# Patient Record
Sex: MALE | Race: OTHER | HISPANIC OR LATINO | URBAN - METROPOLITAN AREA
[De-identification: names, ages, dates, MRNs, and addresses within clinical notes are randomized per-mention and may not be internally consistent; named-entity substitution may affect disease eponyms.]

---

## 2018-05-13 ENCOUNTER — EMERGENCY (EMERGENCY)
Facility: HOSPITAL | Age: 38
LOS: 0 days | Discharge: HOME | End: 2018-05-13
Attending: EMERGENCY MEDICINE | Admitting: EMERGENCY MEDICINE

## 2018-05-13 VITALS
DIASTOLIC BLOOD PRESSURE: 87 MMHG | SYSTOLIC BLOOD PRESSURE: 142 MMHG | HEART RATE: 100 BPM | RESPIRATION RATE: 20 BRPM | OXYGEN SATURATION: 98 % | TEMPERATURE: 102 F

## 2018-05-13 VITALS
DIASTOLIC BLOOD PRESSURE: 87 MMHG | TEMPERATURE: 98 F | SYSTOLIC BLOOD PRESSURE: 131 MMHG | OXYGEN SATURATION: 98 % | HEART RATE: 86 BPM | RESPIRATION RATE: 18 BRPM

## 2018-05-13 DIAGNOSIS — R30.0 DYSURIA: ICD-10-CM

## 2018-05-13 DIAGNOSIS — J18.9 PNEUMONIA, UNSPECIFIED ORGANISM: ICD-10-CM

## 2018-05-13 DIAGNOSIS — M79.1 MYALGIA: ICD-10-CM

## 2018-05-13 DIAGNOSIS — J02.9 ACUTE PHARYNGITIS, UNSPECIFIED: ICD-10-CM

## 2018-05-13 DIAGNOSIS — R35.0 FREQUENCY OF MICTURITION: ICD-10-CM

## 2018-05-13 DIAGNOSIS — R10.84 GENERALIZED ABDOMINAL PAIN: ICD-10-CM

## 2018-05-13 DIAGNOSIS — R50.9 FEVER, UNSPECIFIED: ICD-10-CM

## 2018-05-13 LAB
ALBUMIN SERPL ELPH-MCNC: 4.5 G/DL — SIGNIFICANT CHANGE UP (ref 3.5–5.2)
ALP SERPL-CCNC: 110 U/L — SIGNIFICANT CHANGE UP (ref 30–115)
ALT FLD-CCNC: 32 U/L — SIGNIFICANT CHANGE UP (ref 0–41)
ANION GAP SERPL CALC-SCNC: 17 MMOL/L — HIGH (ref 7–14)
APPEARANCE UR: (no result)
AST SERPL-CCNC: 27 U/L — SIGNIFICANT CHANGE UP (ref 0–41)
BASOPHILS # BLD AUTO: 0.04 K/UL — SIGNIFICANT CHANGE UP (ref 0–0.2)
BASOPHILS NFR BLD AUTO: 0.4 % — SIGNIFICANT CHANGE UP (ref 0–1)
BILIRUB SERPL-MCNC: 0.3 MG/DL — SIGNIFICANT CHANGE UP (ref 0.2–1.2)
BILIRUB UR-MCNC: NEGATIVE — SIGNIFICANT CHANGE UP
BUN SERPL-MCNC: 8 MG/DL — LOW (ref 10–20)
CALCIUM SERPL-MCNC: 9.3 MG/DL — SIGNIFICANT CHANGE UP (ref 8.5–10.1)
CHLORIDE SERPL-SCNC: 98 MMOL/L — SIGNIFICANT CHANGE UP (ref 98–110)
CO2 SERPL-SCNC: 22 MMOL/L — SIGNIFICANT CHANGE UP (ref 17–32)
COLOR SPEC: YELLOW — SIGNIFICANT CHANGE UP
CREAT SERPL-MCNC: 0.7 MG/DL — SIGNIFICANT CHANGE UP (ref 0.7–1.5)
DIFF PNL FLD: (no result)
EOSINOPHIL # BLD AUTO: 0.02 K/UL — SIGNIFICANT CHANGE UP (ref 0–0.7)
EOSINOPHIL NFR BLD AUTO: 0.2 % — SIGNIFICANT CHANGE UP (ref 0–8)
GLUCOSE SERPL-MCNC: 100 MG/DL — HIGH (ref 70–99)
GLUCOSE UR QL: NEGATIVE MG/DL — SIGNIFICANT CHANGE UP
HCT VFR BLD CALC: 44.7 % — SIGNIFICANT CHANGE UP (ref 42–52)
HGB BLD-MCNC: 15.4 G/DL — SIGNIFICANT CHANGE UP (ref 14–18)
IMM GRANULOCYTES NFR BLD AUTO: 0.4 % — HIGH (ref 0.1–0.3)
KETONES UR-MCNC: NEGATIVE — SIGNIFICANT CHANGE UP
LEUKOCYTE ESTERASE UR-ACNC: NEGATIVE — SIGNIFICANT CHANGE UP
LIDOCAIN IGE QN: 26 U/L — SIGNIFICANT CHANGE UP (ref 7–60)
LYMPHOCYTES # BLD AUTO: 1.96 K/UL — SIGNIFICANT CHANGE UP (ref 1.2–3.4)
LYMPHOCYTES # BLD AUTO: 20.2 % — LOW (ref 20.5–51.1)
MCHC RBC-ENTMCNC: 30.5 PG — SIGNIFICANT CHANGE UP (ref 27–31)
MCHC RBC-ENTMCNC: 34.5 G/DL — SIGNIFICANT CHANGE UP (ref 32–37)
MCV RBC AUTO: 88.5 FL — SIGNIFICANT CHANGE UP (ref 80–94)
MONOCYTES # BLD AUTO: 0.77 K/UL — HIGH (ref 0.1–0.6)
MONOCYTES NFR BLD AUTO: 7.9 % — SIGNIFICANT CHANGE UP (ref 1.7–9.3)
NEUTROPHILS # BLD AUTO: 6.86 K/UL — HIGH (ref 1.4–6.5)
NEUTROPHILS NFR BLD AUTO: 70.9 % — SIGNIFICANT CHANGE UP (ref 42.2–75.2)
NITRITE UR-MCNC: NEGATIVE — SIGNIFICANT CHANGE UP
NRBC # BLD: 0 /100 WBCS — SIGNIFICANT CHANGE UP (ref 0–0)
PH UR: 6.5 — SIGNIFICANT CHANGE UP (ref 5–8)
PLATELET # BLD AUTO: 192 K/UL — SIGNIFICANT CHANGE UP (ref 130–400)
POTASSIUM SERPL-MCNC: 4.7 MMOL/L — SIGNIFICANT CHANGE UP (ref 3.5–5)
POTASSIUM SERPL-SCNC: 4.7 MMOL/L — SIGNIFICANT CHANGE UP (ref 3.5–5)
PROT SERPL-MCNC: 7.1 G/DL — SIGNIFICANT CHANGE UP (ref 6–8)
PROT UR-MCNC: NEGATIVE MG/DL — SIGNIFICANT CHANGE UP
RBC # BLD: 5.05 M/UL — SIGNIFICANT CHANGE UP (ref 4.7–6.1)
RBC # FLD: 12.6 % — SIGNIFICANT CHANGE UP (ref 11.5–14.5)
RBC CASTS # UR COMP ASSIST: SIGNIFICANT CHANGE UP /HPF
SODIUM SERPL-SCNC: 137 MMOL/L — SIGNIFICANT CHANGE UP (ref 135–146)
SP GR SPEC: 1.01 — SIGNIFICANT CHANGE UP (ref 1.01–1.03)
UROBILINOGEN FLD QL: 0.2 MG/DL — SIGNIFICANT CHANGE UP (ref 0.2–0.2)
WBC # BLD: 9.69 K/UL — SIGNIFICANT CHANGE UP (ref 4.8–10.8)
WBC # FLD AUTO: 9.69 K/UL — SIGNIFICANT CHANGE UP (ref 4.8–10.8)

## 2018-05-13 RX ORDER — SODIUM CHLORIDE 9 MG/ML
1000 INJECTION INTRAMUSCULAR; INTRAVENOUS; SUBCUTANEOUS ONCE
Qty: 0 | Refills: 0 | Status: COMPLETED | OUTPATIENT
Start: 2018-05-13 | End: 2018-05-13

## 2018-05-13 RX ORDER — KETOROLAC TROMETHAMINE 30 MG/ML
30 SYRINGE (ML) INJECTION ONCE
Qty: 0 | Refills: 0 | Status: DISCONTINUED | OUTPATIENT
Start: 2018-05-13 | End: 2018-05-13

## 2018-05-13 RX ORDER — CLARITHROMYCIN 500 MG
1 TABLET ORAL
Qty: 1 | Refills: 0 | OUTPATIENT
Start: 2018-05-13 | End: 2018-05-17

## 2018-05-13 RX ORDER — ACETAMINOPHEN 500 MG
975 TABLET ORAL ONCE
Qty: 0 | Refills: 0 | Status: COMPLETED | OUTPATIENT
Start: 2018-05-13 | End: 2018-05-13

## 2018-05-13 RX ADMIN — Medication 30 MILLIGRAM(S): at 12:06

## 2018-05-13 RX ADMIN — SODIUM CHLORIDE 1000 MILLILITER(S): 9 INJECTION INTRAMUSCULAR; INTRAVENOUS; SUBCUTANEOUS at 12:06

## 2018-05-13 RX ADMIN — Medication 975 MILLIGRAM(S): at 10:04

## 2018-05-13 NOTE — ED PROVIDER NOTE - PHYSICAL EXAMINATION
VITAL SIGNS: AFebrile, vital signs stable  CONSTITUTIONAL: Well-developed; well-nourished; in no acute distress.  SKIN: Skin exam is warm and dry, no acute rash.  HEAD: Normocephalic; atraumatic.  EYES: Pupils equal round reactive to light, Extraocular movements intact; conjunctiva and sclera clear.  ENT: No nasal discharge; airway clear. Moist mucus membranes. OP erythema and exudates bilat, no edema/PTA. midline uvula. normal speech no drooling.  NECK: Supple; non tender. No rigidity  CARD: Regular rate and rhythm. Normal S1, S2; no murmurs, gallops, or rubs.  RESP: Lungs clear to auscultation bilaterally. No wheezes, rales or rhonchi.  ABD: Abdomen soft; non-tender; non-distended;  no hepatosplenomegaly. No costovertebral angle tenderness.   EXT: Normal ROM. No clubbing, cyanosis or edema. No calf tenderness to palpation.  NEURO: Alert and Oriented x 3, Cranial nerves 2-12 intact, No nystagmus.  5/5 motor x 4 extremities, Sensation intact to light touch x 4 extremities, No facial droop or slurred speech. No pronator drift.  Normal rapid alternating movement and finger nose finger bilaterally. No midline cervical/thoracic/lumbar tenderness to palpation or step off. Normal gait, No ataxia.  PSYCH: Cooperative, appropriate.

## 2018-05-13 NOTE — ED PROVIDER NOTE - MEDICAL DECISION MAKING DETAILS
pt w ?PNA on CXR, definitely had pharyngitis w exudates on exam will treat both w azithromycin x 5 days, well laurie tolerating po, will dc home w medicine clinic f/u 1-2 weeks as outpt. strict return precautions provided. pt w ?PNA on CXR, definitely had pharyngitis w exudates on exam will treat both w azithromycin x 5 days, well appearing, tolerating po, ambulating independently in ED, feels better, will dc home w medicine clinic f/u 1-2 weeks as outpt. strict return precautions provided. pt w ?PNA on CXR (no cough), definitely had pharyngitis w exudates on exam will treat both w azithromycin x 5 days, well appearing, tolerating po, ambulating independently in ED, feels better, will dc home w medicine clinic f/u 1-2 weeks as outpt. strict return precautions provided.

## 2018-05-13 NOTE — ED PROVIDER NOTE - PLAN OF CARE
a/p: Fever, aches, pharyngitis on exam, urinary sx, will do labs, CXR, UA, ivf, toradol, treat pharyngitis w abx and re-eval. abd pain resolved and nontender on exam, tolerating po. H&P not c/w meningitis, appendicitis.

## 2018-05-13 NOTE — ED PROVIDER NOTE - CARE PLAN
Principal Discharge DX:	Pharyngitis  Secondary Diagnosis:	Pneumonia Principal Discharge DX:	Pharyngitis  Assessment and plan of treatment:	a/p: Fever, aches, pharyngitis on exam, urinary sx, will do labs, CXR, UA, ivf, toradol, treat pharyngitis w abx and re-eval. abd pain resolved and nontender on exam, tolerating po. H&P not c/w meningitis, appendicitis.  Secondary Diagnosis:	Pneumonia

## 2018-05-13 NOTE — ED ADULT NURSE REASSESSMENT NOTE - NS ED NURSE REASSESS COMMENT FT1
Pt alert and orientedx3, pt has no active dirrahea/vomiting at this time. pt complains of decreased appetite and headache. toradol given, will reassess. Safety maintained and hourly rounding performed. Will continue with plan of care.

## 2018-05-13 NOTE — ED PROVIDER NOTE - OBJECTIVE STATEMENT
38M no pmh, p/w 3 days fever, chills, body aches, sore throat, intermittent crampy diffuse abd pain which is not present at this time. nausea and diarrhea watery assoc. no vomiting. no cp, sob. no ha, numbness, weakness, neck pain, AMS. no rash. pt c/o dysuria and freq as well. no hematuria, flank pain. no recent travel, sick contacts. no cough, runny nose, ear ache.

## 2018-05-13 NOTE — ED PROVIDER NOTE - NS ED ROS FT
Review of Systems:  	•	CONSTITUTIONAL - + fever, No diaphoresis, No weight change  	•	SKIN - No rash  	•	HEMATOLOGIC - No abnormal bleeding or bruising  	•	EYES - No eye pain, No blurred vision  	•	ENT - No change in hearing, +sore throat, No neck pain, No rhinorrhea, No ear pain  	•	RESPIRATORY - No shortness of breath, No cough  	•	CARDIAC - No chest pain, No palpitations  	•	GI - + abdominal pain, + nausea, No vomiting, + diarrhea, No constipation, No bright red blood per rectum or melena.                      •                 - + dysuria, +frequency, no hematuria.   	•	ENDO - No polydypsia, No polyuria, No heat/cold intolerance  	•	MUSCULOSKELETAL - No joint paint, No swelling, No back pain  	•	NEUROLOGIC - No numbness, No focal weakness, No headache, No dizziness

## 2018-05-14 LAB
CULTURE RESULTS: NO GROWTH — SIGNIFICANT CHANGE UP
SPECIMEN SOURCE: SIGNIFICANT CHANGE UP

## 2025-06-13 ENCOUNTER — EMERGENCY (EMERGENCY)
Facility: HOSPITAL | Age: 45
LOS: 0 days | Discharge: HOME | End: 2025-06-13
Attending: STUDENT IN AN ORGANIZED HEALTH CARE EDUCATION/TRAINING PROGRAM
Payer: COMMERCIAL

## 2025-06-13 VITALS
HEIGHT: 64 IN | OXYGEN SATURATION: 98 % | SYSTOLIC BLOOD PRESSURE: 134 MMHG | TEMPERATURE: 98 F | HEART RATE: 62 BPM | RESPIRATION RATE: 18 BRPM | DIASTOLIC BLOOD PRESSURE: 68 MMHG | WEIGHT: 185.41 LBS

## 2025-06-13 DIAGNOSIS — S06.0X9A CONCUSSION WITH LOSS OF CONSCIOUSNESS OF UNSPECIFIED DURATION, INITIAL ENCOUNTER: ICD-10-CM

## 2025-06-13 DIAGNOSIS — R29.810 FACIAL WEAKNESS: ICD-10-CM

## 2025-06-13 DIAGNOSIS — H53.8 OTHER VISUAL DISTURBANCES: ICD-10-CM
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
## 2025-06-13 DIAGNOSIS — R51.9 HEADACHE, UNSPECIFIED: ICD-10-CM

## 2025-06-13 DIAGNOSIS — S19.9XXA UNSPECIFIED INJURY OF NECK, INITIAL ENCOUNTER: ICD-10-CM
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
## 2025-06-13 LAB
ANION GAP SERPL CALC-SCNC: 10 MMOL/L — SIGNIFICANT CHANGE UP (ref 7–14)
BASOPHILS # BLD AUTO: 0.03 K/UL — SIGNIFICANT CHANGE UP (ref 0–0.2)
BASOPHILS NFR BLD AUTO: 0.5 % — SIGNIFICANT CHANGE UP (ref 0–1)
BUN SERPL-MCNC: 24 MG/DL — HIGH (ref 10–20)
CALCIUM SERPL-MCNC: 8.9 MG/DL — SIGNIFICANT CHANGE UP (ref 8.4–10.5)
CHLORIDE SERPL-SCNC: 108 MMOL/L — SIGNIFICANT CHANGE UP (ref 98–110)
CO2 SERPL-SCNC: 23 MMOL/L — SIGNIFICANT CHANGE UP (ref 17–32)
CREAT SERPL-MCNC: 1 MG/DL — SIGNIFICANT CHANGE UP (ref 0.7–1.5)
EGFR: 95 ML/MIN/1.73M2 — SIGNIFICANT CHANGE UP
EGFR: 95 ML/MIN/1.73M2 — SIGNIFICANT CHANGE UP
EOSINOPHIL # BLD AUTO: 0.29 K/UL — SIGNIFICANT CHANGE UP (ref 0–0.7)
EOSINOPHIL NFR BLD AUTO: 4.8 % — SIGNIFICANT CHANGE UP (ref 0–8)
GLUCOSE SERPL-MCNC: 109 MG/DL — HIGH (ref 70–99)
HCT VFR BLD CALC: 39.7 % — LOW (ref 42–52)
HGB BLD-MCNC: 13.1 G/DL — LOW (ref 14–18)
IMM GRANULOCYTES NFR BLD AUTO: 0.3 % — SIGNIFICANT CHANGE UP (ref 0.1–0.3)
LYMPHOCYTES # BLD AUTO: 2.2 K/UL — SIGNIFICANT CHANGE UP (ref 1.2–3.4)
LYMPHOCYTES # BLD AUTO: 36.6 % — SIGNIFICANT CHANGE UP (ref 20.5–51.1)
MCHC RBC-ENTMCNC: 31.3 PG — HIGH (ref 27–31)
MCHC RBC-ENTMCNC: 33 G/DL — SIGNIFICANT CHANGE UP (ref 32–37)
MCV RBC AUTO: 94.7 FL — HIGH (ref 80–94)
MONOCYTES # BLD AUTO: 0.4 K/UL — SIGNIFICANT CHANGE UP (ref 0.1–0.6)
MONOCYTES NFR BLD AUTO: 6.7 % — SIGNIFICANT CHANGE UP (ref 1.7–9.3)
NEUTROPHILS # BLD AUTO: 3.07 K/UL — SIGNIFICANT CHANGE UP (ref 1.4–6.5)
NEUTROPHILS NFR BLD AUTO: 51.1 % — SIGNIFICANT CHANGE UP (ref 42.2–75.2)
NRBC BLD AUTO-RTO: 0 /100 WBCS — SIGNIFICANT CHANGE UP (ref 0–0)
PLATELET # BLD AUTO: 169 K/UL — SIGNIFICANT CHANGE UP (ref 130–400)
PMV BLD: 11.3 FL — HIGH (ref 7.4–10.4)
POTASSIUM SERPL-MCNC: 4 MMOL/L — SIGNIFICANT CHANGE UP (ref 3.5–5)
POTASSIUM SERPL-SCNC: 4 MMOL/L — SIGNIFICANT CHANGE UP (ref 3.5–5)
RBC # BLD: 4.19 M/UL — LOW (ref 4.7–6.1)
RBC # FLD: 12.9 % — SIGNIFICANT CHANGE UP (ref 11.5–14.5)
SODIUM SERPL-SCNC: 141 MMOL/L — SIGNIFICANT CHANGE UP (ref 135–146)
WBC # BLD: 6.01 K/UL — SIGNIFICANT CHANGE UP (ref 4.8–10.8)
WBC # FLD AUTO: 6.01 K/UL — SIGNIFICANT CHANGE UP (ref 4.8–10.8)

## 2025-06-13 PROCEDURE — 70450 CT HEAD/BRAIN W/O DYE: CPT | Mod: 26,59

## 2025-06-13 PROCEDURE — 99285 EMERGENCY DEPT VISIT HI MDM: CPT

## 2025-06-13 PROCEDURE — 70450 CT HEAD/BRAIN W/O DYE: CPT

## 2025-06-13 PROCEDURE — 36415 COLL VENOUS BLD VENIPUNCTURE: CPT

## 2025-06-13 PROCEDURE — 85025 COMPLETE CBC W/AUTO DIFF WBC: CPT

## 2025-06-13 PROCEDURE — 70498 CT ANGIOGRAPHY NECK: CPT | Mod: 26

## 2025-06-13 PROCEDURE — 70498 CT ANGIOGRAPHY NECK: CPT

## 2025-06-13 PROCEDURE — 99284 EMERGENCY DEPT VISIT MOD MDM: CPT | Mod: 25

## 2025-06-13 PROCEDURE — 99053 MED SERV 10PM-8AM 24 HR FAC: CPT

## 2025-06-13 PROCEDURE — 80048 BASIC METABOLIC PNL TOTAL CA: CPT

## 2025-06-13 PROCEDURE — 70496 CT ANGIOGRAPHY HEAD: CPT | Mod: 26

## 2025-06-13 PROCEDURE — 36000 PLACE NEEDLE IN VEIN: CPT | Mod: XU

## 2025-06-13 PROCEDURE — 70496 CT ANGIOGRAPHY HEAD: CPT

## 2025-06-13 RX ORDER — LIDOCAINE HYDROCHLORIDE 20 MG/ML
1 JELLY TOPICAL ONCE
Refills: 0 | Status: COMPLETED | OUTPATIENT
Start: 2025-06-13 | End: 2025-06-13

## 2025-06-13 RX ORDER — IBUPROFEN 200 MG
600 TABLET ORAL ONCE
Refills: 0 | Status: COMPLETED | OUTPATIENT
Start: 2025-06-13 | End: 2025-06-13

## 2025-06-13 RX ADMIN — Medication 600 MILLIGRAM(S): at 00:59

## 2025-06-13 RX ADMIN — LIDOCAINE HYDROCHLORIDE 1 PATCH: 20 JELLY TOPICAL at 00:59

## 2025-06-13 NOTE — ED PROVIDER NOTE - NSFOLLOWUPINSTRUCTIONS_ED_ALL_ED_FT
Concussion, Adult  Three rear views of the head showing how quick, sudden head movements injure the brain.  A concussion is a brain injury from a hard, direct hit (trauma) to the head or body. This direct hit causes the brain to shake quickly back and forth inside the skull. This can damage brain cells and cause chemical changes in the brain. A concussion may also be known as a mild traumatic brain injury (TBI).    The effects of a concussion can be serious. If you have a concussion, you should be very careful to avoid having a second concussion.    What are the causes?  This condition is caused by:  A direct hit to your head.  Sudden movement of your body that causes your brain to move back and forth inside the skull, such as in a car crash.  What are the signs or symptoms?  The signs of a concussion can be hard to notice. Early on, they may be missed by you, family members, and health care providers. You may look fine on the outside but may act or feel differently.    Every head injury is different. Symptoms are usually temporary but may last for days, weeks, or even months. Some symptoms appear right away, but other symptoms may not show up for hours or days.    Physical symptoms    Headaches.  Dizziness and problems with coordination or balance.  Sensitivity to light or noise.  Nausea or vomiting.  Tiredness (fatigue).  Vision or hearing problems.  Seizure.  Mental and emotional symptoms    Irritability or mood changes.  Memory problems.  Trouble concentrating, organizing, or making decisions.  Changes in eating or sleeping patterns.  Slowness in thinking, acting or reacting, speaking, or reading.  Anxiety or depression.  How is this diagnosed?  This condition is diagnosed based on your symptoms and injury.    You may also have tests, including:  Imaging tests, such as a CT scan or an MRI.  Neuropsychological tests. These measure your thinking, understanding, learning, and memory.  How is this treated?  Treatment for this condition includes:  Stopping sports or activity if you are injured.  Physical and mental rest and careful observation, usually at home.  Medicines to help with symptoms such as headaches, nausea, or difficulty sleeping.  Referral to a concussion clinic or rehab center.  Follow these instructions at home:  Activity    Limit activities that require a lot of thought or concentration, such as:  Doing homework or job-related work.  Watching TV.  Using the computer or phone.  Playing memory games and doing puzzles.  Rest helps your brain heal. Make sure you:  Get plenty of sleep. Most adults should get 7–9 hours of sleep each night.  Rest during the day. Take naps or rest breaks when you feel tired.  Avoid high-intensity exercise or physical activities that take a lot of effort. Stop any activity that worsens symptoms. Your health care provider may recommend light exercise such as walking.  Do not do high-risk activities that could cause a second concussion, such as riding a bike or playing sports.  Ask your health care provider when you can return to your normal activities, such as school, work, sports, and driving. Your ability to react may be slower after a brain injury. Never do these activities if you are dizzy.  General instructions    A bottle of beer, a glass of wine, and a glass of hard liquor with a "do not drink" sign over them.   Take over-the-counter and prescription medicines only as told by your health care provider. Some medicines, such as blood thinners (anticoagulants) and aspirin, may increase the risk for complications, such as bleeding.  Avoid taking opioid pain medicine while recovering from a concussion.  Do not drink alcohol until your health care provider says you can. Drinking alcohol may slow your recovery and can put you at risk of further injury.  Watch your symptoms and tell others around you to do the same. Complications sometimes occur after a concussion.  Tell your , teachers, school nurse, school counselor, , or  about your injury, symptoms, and restrictions.  See a mental health therapist if you feel anxious or depressed. Managing this condition can be challenging.  Keep all follow-up visits. Your health care provider will check on your recovery and give you a plan for returning to activities.  How is this prevented?  Avoiding another brain injury is very important. In rare cases, another injury can lead to permanent brain damage, brain swelling, or death. The risk of this is greatest during the first 7–10 days after a head injury. Avoid injuries by:  Stopping activities that could lead to a second concussion, such as contact or recreational sports, until your health care provider says it is okay.  Taking these actions once you have returned to sports or activities:  Avoid plays or moves that can cause you to crash into another person. This is how most concussions occur.  Follow the rules and be respectful of other players. Do not engage in violent or illegal plays.  Getting regular exercise that includes strength and balance training.  Wearing a properly fitting helmet during sports, biking, or other activities. Helmets can help protect you from serious skull and brain injuries, but they may not protect you from a concussion. Even when wearing a helmet, you should avoid being hit in the head.  Where to find more information  Centers for Disease Control and Prevention: cdc.gov  Contact a health care provider if:  Your symptoms do not improve or get worse.  You have new symptoms.  You have another injury.  Your coordination gets worse.  You have unusual behavior changes.  Get help right away if:  You have a severe or worsening headache.  You have weakness or numbness in any part of your body, slurred speech, vision changes, or confusion.  You vomit repeatedly.  You lose consciousness, are sleepier than normal, or are difficult to wake up.  You have a seizure.  These symptoms may be an emergency. Get help right away. Call 911.  Do not wait to see if the symptoms will go away.  Do not drive yourself to the hospital.  Also, get help right away if:  You have thoughts of hurting yourself or others.  Take one of these steps if you feel like you may hurt yourself or others, or have thoughts about taking your own life:  Go to your nearest emergency room.  Call 911.  Call the National Suicide Prevention Lifeline at 1-245.288.7754 or 732. This is open 24 hours a day.  Text the Crisis Text Line at 405879.  This information is not intended to replace advice given to you by your health care provider. Make sure you discuss any questions you have with your health care provider.          Our Emergency Department Referral Coordinators will be reaching out to you in the next 24-48 hours from 9:00am to 5:00pm with a follow up appointment. Please expect a phone call from the hospital in that time frame. If you do not receive a call or if you have any questions or concerns, you can reach them at   [phone]

## 2025-06-13 NOTE — ED PROVIDER NOTE - PATIENT PORTAL LINK FT
You can access the FollowMyHealth Patient Portal offered by [facility] by registering at the following website: [email or web address]. By joining [employer]’s FollowMyHealth portal, you will also be able to view your health information using other applications (apps) compatible with our system.

## 2025-06-13 NOTE — CONSULT NOTE ADULT - SUBJECTIVE AND OBJECTIVE BOX
NEUROLOGY CONSULT    HPI:  46 yo M w/ no known PMHx presented with transient blurry vision and subtle facial asymmetry. 6/11 10AM patient was at work and he was hit by a piece of plywood on the back of his neck from 15 feet above him. He immediately had neck pain and saw dark for a few seconds, later he had some blurry vision in both eyes. Patient went to [facility] ED. per patient, he did CT and MRI of the head and neck which were negative and he was discharged home. Later that night patient had 3 episodes of transient blurry vision lasting for ~2 minutes each. 6/12 Patient only has neck pain and does not have more episodes of blurry vision or any other symptoms. Patient went to urgent care where he was told he has mild right facial droop. He was then referred to ED. Patient is not sure if this facial finding is his baseline or not. He states he did not pay attention to his face therefore he cannot tell if his face is different now. Patient denies headaches, dizziness, vision change, hearing loss, weakness, numbness, difficulty speaking, difficulty walking. CTH/CTA no acute abnormalities.        MEDICATIONS  Home Medications:    MEDICATIONS  (STANDING):    MEDICATIONS  (PRN):    FAMILY HISTORY:    SOCIAL HISTORY: negative for tobacco, alcohol, or ilicit drug use.    Allergies    No Known Allergies    Intolerances      Neurological Examination:  General:  Appearance is consistent with chronologic age.   Cognitive/Language:  Awake, alert, and oriented to person, place, time and date.  Recent and remote memory intact.  Fund of knowledge is appropriate.  Naming, repetition and comprehension intact. Nondysarthric.    Cranial Nerves  - Eyes: Visual fields full.  EOMI w/o nystagmus, skew or reported double vision.  PERRL.  No ptosis/weakness of eyelid closure.    - Face:  Facial sensation normal V1 - 3. Very subtle asymmetry on smile.   - Ears/Nose/Throat:  Hearing grossly intact b/l to finger rub.  Palate elevates midline.  Tongue and uvula midline.   Motor exam: Normal tone and bulk. No tenderness, twitching, tremors or involuntary movements.            Upper extremity                  Bicep     Tricep     HG                                                 R      5/5        5/5          5/5                                                    L       5/5        5/5          5/5              Lower extremity                   HF        KE        DF         PF                                                  R     5/5       5/5       5/5       5/5                                               L      5/5      5/5       5/5        5/5    Sensory examination:  Intact to light touch and pinprick, pain, temperature and proprioception and vibration in all extremities.  Reflexes: 2+ b/l biceps, triceps, patella and achilles.  Cerebellum: FTN/HKS intact.  No dysmetria  Gait narrow based and normal.    LABS:                        13.1   6.01  )-----------( 169      ( 13 Jun 2025 01:10 )             39.7     06-13    141  |  108  |  24[H]  ----------------------------<  109[H]  4.0   |  23  |  1.0    Ca    8.9      13 Jun 2025 01:10      Hemoglobin A1C:   Vitamin B12     CAPILLARY BLOOD GLUCOSE      Urinalysis Basic - ( 13 Jun 2025 01:10 )    Color: x / Appearance: x / SG: x / pH: x  Gluc: 109 mg/dL / Ketone: x  / Bili: x / Urobili: x   Blood: x / Protein: x / Nitrite: x   Leuk Esterase: x / RBC: x / WBC x   Sq Epi: x / Non Sq Epi: x / Bacteria: x      RADIOLOGY, EKG AND ADDITIONAL TESTS:  CTH: No acute intracranial pathology. Small CSF filled density in the peripheral left parietal lobe measuring 1.1 cm.  CTA: No evidence of a large vessel occlusion, aneurysm, or vascular malformation.

## 2025-06-13 NOTE — ED PROVIDER NOTE - PHYSICAL EXAMINATION
CONSTITUTIONAL: Well-appearing; well-nourished; in no apparent distress.   EYES: PERRL; EOM intact.   ENT: normal nose; no rhinorrhea; normal pharynx with no tonsillar hypertrophy.   NECK: Supple; non-tender; no cervical lymphadenopathy.   CARDIOVASCULAR: Normal S1, S2; no murmurs, rubs, or gallops. Equal radial pulses  RESPIRATORY: Normal chest excursion with respiration; breath sounds clear and equal bilaterally; no wheezes, rhonchi, or rales.  GI/: Normal bowel sounds; non-distended; non-tender; no palpable organomegaly.   MS: No evidence of trauma or deformity. Normal ROM in all four extremities; non-tender to palpation; distal pulses are normal.   SKIN: Normal for age and race; warm; dry; good turgor; no apparent lesions or exudate.   NEURO/PSYCH: A & O x 4; + asymmetric smile noted. No tongue deviation. Cerebellar intact. Normal gait. Sensation intact. Strength equal b/l 5/5 throughout

## 2025-06-13 NOTE — ED PROVIDER NOTE - CLINICAL SUMMARY MEDICAL DECISION MAKING FREE TEXT BOX
45M no pmh ehre with headache, episodic bl blurry vision after head trauma yesterday. had - cth yesterday. was at urgent care today and concern for facial droop. not noteable on my exam. A&Ox3, CN 2-11 intact, moving all extremities, 5/5 strength, equal sensation bilaterally, normal finger to nose exam. normal steady gait.   wife convinced the droop of the r side of his mouth is new. unclear timing of onset.   suspect sx more related to concussion but pt concern about sx. discussed with neuro after - ct imaging. 45M no pmh ehre with headache, episodic bl blurry vision after head trauma yesterday. had - cth yesterday. was at urgent care today and concern for facial droop. not noteable on my exam. A&Ox3, CN 2-11 intact, moving all extremities, 5/5 strength, equal sensation bilaterally, normal finger to nose exam. normal steady gait.   wife convinced the droop of the r side of his mouth is new. unclear timing of onset.   suspect sx more related to concussion but pt concern about sx. discussed with neuro after - ct imaging. pt to [facility] as outpatient

## 2025-06-13 NOTE — ED PROVIDER NOTE - OBJECTIVE STATEMENT
45 year old M no pmhx presenting to ed for eval. Pt sts yesterday at 10am while working had a 2 x 4 piece of plywood fall on his neck from 15 feet above him. Pt was seen at [facility] and had reportedly neg ct/mri of neck and dc home. Sts last night had 3 episodes of b/l blurry vision (lasting about 2-3 min each). Pt has had no further episodes of blurry vision today. Pt was seen at  today and noted to have possible rt sided facial droop. Pt c/o mild neck pain. He denies any headache, nausea, vomiting, extremity numbness or weakness, unsteady gait, syncope.

## 2025-06-13 NOTE — CONSULT NOTE ADULT - ASSESSMENT
46 yo M w/ no known PMHx presented with transient blurry vision and subtle facial asymmetry after a neck injury. Neuro exam is overall intact, the very subtle facial asymmetry could be his baseline. Blurry vision has resolved. No new neuro deficits noted. Patient can follow up outpatient.    Recommendations  - No further inpatient neurological workup    Case discussed with attending Dr. Doherty.